# Patient Record
Sex: FEMALE
[De-identification: names, ages, dates, MRNs, and addresses within clinical notes are randomized per-mention and may not be internally consistent; named-entity substitution may affect disease eponyms.]

---

## 2019-10-01 ENCOUNTER — HOSPITAL ENCOUNTER (OUTPATIENT)
Dept: HOSPITAL 88 - WCC | Age: 74
LOS: 30 days | End: 2019-10-31
Attending: FAMILY MEDICINE
Payer: MEDICARE

## 2019-10-01 DIAGNOSIS — B95.2: ICD-10-CM

## 2019-10-01 DIAGNOSIS — E03.9: ICD-10-CM

## 2019-10-01 DIAGNOSIS — B96.89: ICD-10-CM

## 2019-10-01 DIAGNOSIS — I87.2: ICD-10-CM

## 2019-10-01 DIAGNOSIS — M96.89: ICD-10-CM

## 2019-10-01 DIAGNOSIS — T81.32XA: Primary | ICD-10-CM

## 2019-10-01 DIAGNOSIS — M13.80: ICD-10-CM

## 2019-10-01 DIAGNOSIS — R60.0: ICD-10-CM

## 2019-11-25 ENCOUNTER — HOSPITAL ENCOUNTER (OUTPATIENT)
Dept: HOSPITAL 88 - WCC | Age: 74
LOS: 5 days | End: 2019-11-30
Attending: FAMILY MEDICINE
Payer: MEDICARE

## 2019-11-25 DIAGNOSIS — M13.80: ICD-10-CM

## 2019-11-25 DIAGNOSIS — B95.2: ICD-10-CM

## 2019-11-25 DIAGNOSIS — E03.9: ICD-10-CM

## 2019-11-25 DIAGNOSIS — B96.89: ICD-10-CM

## 2019-11-25 DIAGNOSIS — T81.32XA: Primary | ICD-10-CM

## 2019-11-25 DIAGNOSIS — M96.89: ICD-10-CM

## 2019-11-25 DIAGNOSIS — I87.2: ICD-10-CM

## 2019-11-25 DIAGNOSIS — R60.0: ICD-10-CM

## 2020-02-04 NOTE — PROGRESS NOTE
DATE:  02/04/2020

 

Followup Progress Note 

 

SUBJECTIVE:  The patient came to [facility] today for a followup visit. 

 

PHYSICAL EXAMINATION:

Ulcer on distal medial right foot is slightly smaller and covered with 100% tan slough.

There is no malodor or periulcer skin erythema identified.  The ulcer is probable to

bone. 

 

The patient states that her pain in right foot/ankle has gone down to 5.  She completed

the course of oral cefdinir 300 mg twice daily today. 

PROCEDURE NOTE:  After obtaining consent from the patient, a time-out was performed.

The patient refused site marking.  After checking the patient's allergies, EMLA was

applied to the ulcer on bilateral right foot.  Full-thickness sharp surgical debridement

to subcutaneous tissue was performed with curette without complications.  Hemostasis was

achieved with pressure. 

 

An allogeneic EpiFix graft obtained from the human placenta #3 was applied to the ulcer

on medial right foot to promote granulation tissue and to aid in covering the probable

bone.  This procedure was performed in a sterile manner.  The EpiFix graft was then

secured with steri-strips and compression dressing with Drawtex was applied over the

graft. 

 

Lot number of the graft [id number].  The expiration date of graft is 2024-10-01. 

 

The pre and post surgical measurements are recorded.   

 

The EpiFix used is 18.0 mm, which is equal to 3 cm2.  100% of the graft was used.  There

was no wastage of the graft. 

 

DIAGNOSES:  

1. Chronic venous stasis ulcers, distal medial right foot.

2. Cellulitis, right foot, resolved.

 

PLAN:  

1. Change secondary dressing over EpiFix graft twice weekly at [facility], distal medial right foot. 

2. The patient is instructed to call Infectious Disease, Dr. Cherry's office today to

ask whether she is going to give her additional 1 to 2 weeks of oral cefdinir.  The

patient verbalized understanding. 

3. Continue wearing knee high elastic compression stockings on bilateral lower

extremities for edema control daily.  Remove them at night. 

4. Continue taking Mike daily for optimal wound healing.

5. Continue taking vitamin C daily for optimal wound healing.

6. Return to [facility] in 1 week

for followup visit with MD. 

 

 

 

 

______________________________

MD DANIELA Zarate/RISHABH

D:  02/04/2020 13:04:59

T:  02/04/2020 15:58:27

Job #:  [id number]

## 2020-02-28 ENCOUNTER — HOSPITAL ENCOUNTER (OUTPATIENT)
Dept: HOSPITAL 88 - WCC | Age: 75
LOS: 1 days | End: 2020-02-29
Attending: FAMILY MEDICINE
Payer: MEDICARE

## 2020-02-28 DIAGNOSIS — L03.818: ICD-10-CM

## 2020-02-28 DIAGNOSIS — I87.2: ICD-10-CM

## 2020-02-28 DIAGNOSIS — I10: ICD-10-CM

## 2020-02-28 DIAGNOSIS — R60.0: ICD-10-CM

## 2020-02-28 DIAGNOSIS — L97.811: ICD-10-CM

## 2020-02-28 DIAGNOSIS — I87.331: Primary | ICD-10-CM

## 2020-02-28 DIAGNOSIS — M13.80: ICD-10-CM

## 2020-02-28 DIAGNOSIS — B96.89: ICD-10-CM

## 2020-02-28 DIAGNOSIS — E03.9: ICD-10-CM

## 2020-02-28 DIAGNOSIS — M96.89: ICD-10-CM

## 2020-02-28 PROCEDURE — 15275 SKIN SUB GRAFT FACE/NK/HF/G: CPT

## 2020-02-28 PROCEDURE — 29581 APPL MULTLAYER CMPRN SYS LEG: CPT

## 2020-02-28 PROCEDURE — 99213 OFFICE O/P EST LOW 20 MIN: CPT

## 2020-03-10 ENCOUNTER — HOSPITAL ENCOUNTER (OUTPATIENT)
Dept: HOSPITAL 88 - WCC | Age: 75
LOS: 21 days | End: 2020-03-31
Attending: FAMILY MEDICINE
Payer: MEDICARE

## 2020-03-10 DIAGNOSIS — E03.9: ICD-10-CM

## 2020-03-10 DIAGNOSIS — I87.331: Primary | ICD-10-CM

## 2020-03-10 DIAGNOSIS — I10: ICD-10-CM

## 2020-03-10 DIAGNOSIS — R60.0: ICD-10-CM

## 2020-03-10 DIAGNOSIS — L97.811: ICD-10-CM

## 2020-03-10 DIAGNOSIS — L03.116: ICD-10-CM

## 2020-03-10 DIAGNOSIS — M96.89: ICD-10-CM

## 2020-03-10 DIAGNOSIS — I87.2: ICD-10-CM

## 2020-03-10 DIAGNOSIS — M13.80: ICD-10-CM

## 2020-03-10 DIAGNOSIS — B96.89: ICD-10-CM

## 2020-03-10 PROCEDURE — 15275 SKIN SUB GRAFT FACE/NK/HF/G: CPT

## 2020-03-10 PROCEDURE — 99213 OFFICE O/P EST LOW 20 MIN: CPT

## 2020-03-10 PROCEDURE — 29581 APPL MULTLAYER CMPRN SYS LEG: CPT

## 2020-04-23 ENCOUNTER — HOSPITAL ENCOUNTER (OUTPATIENT)
Dept: HOSPITAL 88 - WCC | Age: 75
LOS: 7 days | End: 2020-04-30
Attending: FAMILY MEDICINE
Payer: MEDICARE

## 2020-04-23 DIAGNOSIS — M13.80: ICD-10-CM

## 2020-04-23 DIAGNOSIS — I10: ICD-10-CM

## 2020-04-23 DIAGNOSIS — L97.811: ICD-10-CM

## 2020-04-23 DIAGNOSIS — I87.331: Primary | ICD-10-CM

## 2020-04-23 DIAGNOSIS — R60.0: ICD-10-CM

## 2020-04-23 DIAGNOSIS — I87.2: ICD-10-CM

## 2020-04-23 DIAGNOSIS — M96.89: ICD-10-CM

## 2020-04-23 DIAGNOSIS — E03.9: ICD-10-CM

## 2020-05-21 ENCOUNTER — HOSPITAL ENCOUNTER (OUTPATIENT)
Dept: HOSPITAL 88 - WCC | Age: 75
LOS: 10 days | End: 2020-05-31
Attending: FAMILY MEDICINE
Payer: MEDICARE

## 2020-05-21 DIAGNOSIS — R60.0: ICD-10-CM

## 2020-05-21 DIAGNOSIS — E03.9: ICD-10-CM

## 2020-05-21 DIAGNOSIS — M96.89: ICD-10-CM

## 2020-05-21 DIAGNOSIS — I87.2: ICD-10-CM

## 2020-05-21 DIAGNOSIS — M13.80: ICD-10-CM

## 2020-05-21 DIAGNOSIS — I10: ICD-10-CM

## 2020-05-21 DIAGNOSIS — L97.811: ICD-10-CM

## 2020-05-21 DIAGNOSIS — I87.331: Primary | ICD-10-CM

## 2020-09-24 ENCOUNTER — HOSPITAL ENCOUNTER (OUTPATIENT)
Dept: HOSPITAL 88 - WCC | Age: 75
LOS: 6 days | End: 2020-09-30
Attending: FAMILY MEDICINE
Payer: MEDICARE

## 2020-09-24 DIAGNOSIS — I87.331: Primary | ICD-10-CM

## 2020-09-24 DIAGNOSIS — E03.9: ICD-10-CM

## 2020-09-24 DIAGNOSIS — L97.811: ICD-10-CM

## 2020-09-24 DIAGNOSIS — I87.2: ICD-10-CM

## 2020-09-24 DIAGNOSIS — M13.80: ICD-10-CM

## 2020-09-24 DIAGNOSIS — M96.89: ICD-10-CM

## 2020-09-24 DIAGNOSIS — R60.0: ICD-10-CM

## 2020-09-24 DIAGNOSIS — I10: ICD-10-CM

## 2020-09-24 PROCEDURE — 87071 CULTURE AEROBIC QUANT OTHER: CPT

## 2020-09-24 PROCEDURE — 87186 SC STD MICRODIL/AGAR DIL: CPT

## 2020-09-24 PROCEDURE — 87075 CULTR BACTERIA EXCEPT BLOOD: CPT

## 2020-09-24 PROCEDURE — 87205 SMEAR GRAM STAIN: CPT

## 2020-10-15 ENCOUNTER — HOSPITAL ENCOUNTER (OUTPATIENT)
Dept: HOSPITAL 88 - WCC | Age: 75
LOS: 16 days | End: 2020-10-31
Attending: FAMILY MEDICINE
Payer: MEDICARE

## 2020-10-15 DIAGNOSIS — R60.0: ICD-10-CM

## 2020-10-15 DIAGNOSIS — B96.89: ICD-10-CM

## 2020-10-15 DIAGNOSIS — I87.331: Primary | ICD-10-CM

## 2020-10-15 DIAGNOSIS — M96.89: ICD-10-CM

## 2020-10-15 DIAGNOSIS — I87.2: ICD-10-CM

## 2020-10-15 DIAGNOSIS — M13.80: ICD-10-CM

## 2020-10-15 DIAGNOSIS — I10: ICD-10-CM

## 2020-10-15 DIAGNOSIS — E03.9: ICD-10-CM

## 2020-10-15 DIAGNOSIS — L97.811: ICD-10-CM

## 2020-11-19 ENCOUNTER — HOSPITAL ENCOUNTER (OUTPATIENT)
Dept: HOSPITAL 88 - WCC | Age: 75
LOS: 11 days | End: 2020-11-30
Attending: FAMILY MEDICINE
Payer: MEDICARE

## 2020-11-19 DIAGNOSIS — I87.331: Primary | ICD-10-CM

## 2020-11-19 DIAGNOSIS — M96.89: ICD-10-CM

## 2020-11-19 DIAGNOSIS — I10: ICD-10-CM

## 2020-11-19 DIAGNOSIS — M13.80: ICD-10-CM

## 2020-11-19 DIAGNOSIS — L97.811: ICD-10-CM

## 2020-11-19 DIAGNOSIS — I87.2: ICD-10-CM

## 2020-11-19 DIAGNOSIS — E03.9: ICD-10-CM

## 2020-11-19 DIAGNOSIS — R60.0: ICD-10-CM

## 2020-11-19 DIAGNOSIS — B96.89: ICD-10-CM

## 2021-08-10 LAB
ALBUMIN SERPL-MCNC: 3.2 G/DL (ref 3.5–5)
ALBUMIN/GLOB SERPL: 0.8 {RATIO} (ref 0.8–2)
ALP SERPL-CCNC: 64 IU/L (ref 40–150)
ALT SERPL-CCNC: 19 IU/L (ref 0–55)
ANION GAP SERPL CALC-SCNC: 12 MMOL/L (ref 8–16)
BASOPHILS # BLD AUTO: 0 10*3/UL (ref 0–0.1)
BASOPHILS NFR BLD AUTO: 0.5 % (ref 0–1)
BUN SERPL-MCNC: 12 MG/DL (ref 7–26)
BUN/CREAT SERPL: 19 (ref 6–25)
CALCIUM SERPL-MCNC: 8.5 MG/DL (ref 8.4–10.2)
CHLORIDE SERPL-SCNC: 108 MMOL/L (ref 98–107)
CO2 SERPL-SCNC: 23 MMOL/L (ref 22–29)
DEPRECATED NEUTROPHILS # BLD AUTO: 2.6 10*3/UL (ref 2.1–6.9)
EGFRCR SERPLBLD CKD-EPI 2021: 90 ML/MIN (ref 60–?)
EOSINOPHIL # BLD AUTO: 0.2 10*3/UL (ref 0–0.4)
EOSINOPHIL NFR BLD AUTO: 2.7 % (ref 0–6)
ERYTHROCYTE [DISTWIDTH] IN CORD BLOOD: 14.6 % (ref 11.7–14.4)
GLOBULIN PLAS-MCNC: 3.8 G/DL (ref 2.3–3.5)
GLUCOSE SERPLBLD-MCNC: 116 MG/DL (ref 74–118)
HCT VFR BLD AUTO: 40.5 % (ref 34.2–44.1)
HGB BLD-MCNC: 13.1 G/DL (ref 12–16)
LYMPHOCYTES # BLD: 2.7 10*3/UL (ref 1–3.2)
LYMPHOCYTES NFR BLD AUTO: 43.8 % (ref 18–39.1)
MCH RBC QN AUTO: 32.2 PG (ref 28–32)
MCHC RBC AUTO-ENTMCNC: 32.3 G/DL (ref 31–35)
MCV RBC AUTO: 99.5 FL (ref 81–99)
MONOCYTES # BLD AUTO: 0.7 10*3/UL (ref 0.2–0.8)
MONOCYTES NFR BLD AUTO: 10.4 % (ref 4.4–11.3)
NEUTS SEG NFR BLD AUTO: 42.3 % (ref 38.7–80)
PLATELET # BLD AUTO: 294 X10E3/UL (ref 140–360)
POTASSIUM SERPL-SCNC: 4 MMOL/L (ref 3.5–5.1)
RBC # BLD AUTO: 4.07 X10E6/UL (ref 3.6–5.1)
SODIUM SERPL-SCNC: 139 MMOL/L (ref 136–145)

## 2021-08-17 ENCOUNTER — HOSPITAL ENCOUNTER (OUTPATIENT)
Dept: HOSPITAL 88 - WCC | Age: 76
LOS: 14 days | End: 2021-08-31
Attending: FAMILY MEDICINE
Payer: MEDICARE

## 2021-08-17 DIAGNOSIS — M96.89: ICD-10-CM

## 2021-08-17 DIAGNOSIS — I87.331: Primary | ICD-10-CM

## 2021-08-17 DIAGNOSIS — E03.9: ICD-10-CM

## 2021-08-17 DIAGNOSIS — I10: ICD-10-CM

## 2021-08-17 DIAGNOSIS — L97.311: ICD-10-CM

## 2021-08-17 DIAGNOSIS — M13.80: ICD-10-CM

## 2021-08-17 DIAGNOSIS — R60.0: ICD-10-CM

## 2021-08-17 PROCEDURE — 80053 COMPREHEN METABOLIC PANEL: CPT

## 2021-08-17 PROCEDURE — 87075 CULTR BACTERIA EXCEPT BLOOD: CPT

## 2021-08-17 PROCEDURE — 85025 COMPLETE CBC W/AUTO DIFF WBC: CPT

## 2021-08-17 PROCEDURE — 87205 SMEAR GRAM STAIN: CPT

## 2021-08-17 PROCEDURE — 87071 CULTURE AEROBIC QUANT OTHER: CPT

## 2021-08-17 PROCEDURE — 36415 COLL VENOUS BLD VENIPUNCTURE: CPT

## 2021-08-17 PROCEDURE — 84134 ASSAY OF PREALBUMIN: CPT

## 2021-08-17 PROCEDURE — 87186 SC STD MICRODIL/AGAR DIL: CPT

## 2021-08-17 PROCEDURE — 83036 HEMOGLOBIN GLYCOSYLATED A1C: CPT

## 2021-10-21 LAB
ALBUMIN SERPL-MCNC: 3.2 G/DL (ref 3.5–5)
ALBUMIN/GLOB SERPL: 0.9 {RATIO} (ref 0.8–2)
ALP SERPL-CCNC: 70 IU/L (ref 40–150)
ALT SERPL-CCNC: 23 IU/L (ref 0–55)
ANION GAP SERPL CALC-SCNC: 12 MMOL/L (ref 8–16)
BASOPHILS # BLD AUTO: 0 10*3/UL (ref 0–0.1)
BASOPHILS NFR BLD AUTO: 0.3 % (ref 0–1)
BUN SERPL-MCNC: 12 MG/DL (ref 7–26)
BUN/CREAT SERPL: 18 (ref 6–25)
CALCIUM SERPL-MCNC: 8.5 MG/DL (ref 8.4–10.2)
CHLORIDE SERPL-SCNC: 107 MMOL/L (ref 98–107)
CO2 SERPL-SCNC: 23 MMOL/L (ref 22–29)
DEPRECATED NEUTROPHILS # BLD AUTO: 3.1 10*3/UL (ref 2.1–6.9)
EGFRCR SERPLBLD CKD-EPI 2021: 87 ML/MIN (ref 60–?)
EOSINOPHIL # BLD AUTO: 0.2 10*3/UL (ref 0–0.4)
EOSINOPHIL NFR BLD AUTO: 3.1 % (ref 0–6)
ERYTHROCYTE [DISTWIDTH] IN CORD BLOOD: 13.3 % (ref 11.7–14.4)
GLOBULIN PLAS-MCNC: 3.5 G/DL (ref 2.3–3.5)
GLUCOSE SERPLBLD-MCNC: 106 MG/DL (ref 74–118)
HCT VFR BLD AUTO: 39.9 % (ref 34.2–44.1)
HGB BLD-MCNC: 12.9 G/DL (ref 12–16)
LYMPHOCYTES # BLD: 2.7 10*3/UL (ref 1–3.2)
LYMPHOCYTES NFR BLD AUTO: 40 % (ref 18–39.1)
MCH RBC QN AUTO: 32 PG (ref 28–32)
MCHC RBC AUTO-ENTMCNC: 32.3 G/DL (ref 31–35)
MCV RBC AUTO: 99 FL (ref 81–99)
MONOCYTES # BLD AUTO: 0.7 10*3/UL (ref 0.2–0.8)
MONOCYTES NFR BLD AUTO: 10.2 % (ref 4.4–11.3)
NEUTS SEG NFR BLD AUTO: 46.1 % (ref 38.7–80)
PLATELET # BLD AUTO: 298 X10E3/UL (ref 140–360)
POTASSIUM SERPL-SCNC: 4 MMOL/L (ref 3.5–5.1)
RBC # BLD AUTO: 4.03 X10E6/UL (ref 3.6–5.1)
SODIUM SERPL-SCNC: 138 MMOL/L (ref 136–145)

## 2021-10-28 ENCOUNTER — HOSPITAL ENCOUNTER (OUTPATIENT)
Dept: HOSPITAL 88 - WCC | Age: 76
LOS: 3 days | End: 2021-10-31
Attending: FAMILY MEDICINE
Payer: MEDICARE

## 2021-10-28 DIAGNOSIS — R60.0: ICD-10-CM

## 2021-10-28 DIAGNOSIS — L97.311: ICD-10-CM

## 2021-10-28 DIAGNOSIS — E03.9: ICD-10-CM

## 2021-10-28 DIAGNOSIS — I10: ICD-10-CM

## 2021-10-28 DIAGNOSIS — M96.89: ICD-10-CM

## 2021-10-28 DIAGNOSIS — I87.311: Primary | ICD-10-CM

## 2021-10-28 DIAGNOSIS — M13.80: ICD-10-CM

## 2021-10-28 PROCEDURE — 80053 COMPREHEN METABOLIC PANEL: CPT

## 2021-10-28 PROCEDURE — 84134 ASSAY OF PREALBUMIN: CPT

## 2021-10-28 PROCEDURE — 87186 SC STD MICRODIL/AGAR DIL: CPT

## 2021-10-28 PROCEDURE — 83036 HEMOGLOBIN GLYCOSYLATED A1C: CPT

## 2021-10-28 PROCEDURE — 36415 COLL VENOUS BLD VENIPUNCTURE: CPT

## 2021-10-28 PROCEDURE — 87205 SMEAR GRAM STAIN: CPT

## 2021-10-28 PROCEDURE — 85025 COMPLETE CBC W/AUTO DIFF WBC: CPT

## 2021-10-28 PROCEDURE — 87075 CULTR BACTERIA EXCEPT BLOOD: CPT

## 2021-10-28 PROCEDURE — 87071 CULTURE AEROBIC QUANT OTHER: CPT

## 2021-11-04 ENCOUNTER — HOSPITAL ENCOUNTER (OUTPATIENT)
Dept: HOSPITAL 88 - MRI | Age: 76
End: 2021-11-04
Payer: MEDICARE

## 2021-11-04 DIAGNOSIS — L02.415: Primary | ICD-10-CM

## 2021-11-18 ENCOUNTER — HOSPITAL ENCOUNTER (OUTPATIENT)
Dept: HOSPITAL 88 - WCC | Age: 76
LOS: 12 days | End: 2021-11-30
Attending: FAMILY MEDICINE
Payer: MEDICARE

## 2021-11-18 DIAGNOSIS — E03.9: ICD-10-CM

## 2021-11-18 DIAGNOSIS — B96.89: ICD-10-CM

## 2021-11-18 DIAGNOSIS — R60.0: ICD-10-CM

## 2021-11-18 DIAGNOSIS — I10: ICD-10-CM

## 2021-11-18 DIAGNOSIS — L97.311: ICD-10-CM

## 2021-11-18 DIAGNOSIS — B96.4: ICD-10-CM

## 2021-11-18 DIAGNOSIS — I87.311: Primary | ICD-10-CM

## 2021-11-18 DIAGNOSIS — M13.80: ICD-10-CM

## 2021-11-18 DIAGNOSIS — M96.89: ICD-10-CM

## 2021-12-16 ENCOUNTER — HOSPITAL ENCOUNTER (OUTPATIENT)
Dept: HOSPITAL 88 - WCC | Age: 76
LOS: 15 days | End: 2021-12-31
Attending: FAMILY MEDICINE
Payer: MEDICARE

## 2021-12-16 DIAGNOSIS — L97.311: ICD-10-CM

## 2021-12-16 DIAGNOSIS — B96.89: ICD-10-CM

## 2021-12-16 DIAGNOSIS — M96.89: ICD-10-CM

## 2021-12-16 DIAGNOSIS — I87.311: Primary | ICD-10-CM

## 2021-12-16 DIAGNOSIS — M13.80: ICD-10-CM

## 2021-12-16 DIAGNOSIS — R60.0: ICD-10-CM

## 2021-12-16 DIAGNOSIS — E03.9: ICD-10-CM

## 2021-12-16 DIAGNOSIS — B96.4: ICD-10-CM

## 2021-12-16 DIAGNOSIS — I10: ICD-10-CM

## 2021-12-16 PROCEDURE — 87075 CULTR BACTERIA EXCEPT BLOOD: CPT

## 2021-12-16 PROCEDURE — 87205 SMEAR GRAM STAIN: CPT

## 2021-12-16 PROCEDURE — 87071 CULTURE AEROBIC QUANT OTHER: CPT

## 2022-01-25 ENCOUNTER — HOSPITAL ENCOUNTER (OUTPATIENT)
Dept: HOSPITAL 88 - WCC | Age: 77
LOS: 6 days | End: 2022-01-31
Attending: FAMILY MEDICINE
Payer: MEDICARE

## 2022-01-25 DIAGNOSIS — B96.89: ICD-10-CM

## 2022-01-25 DIAGNOSIS — E03.9: ICD-10-CM

## 2022-01-25 DIAGNOSIS — M96.89: ICD-10-CM

## 2022-01-25 DIAGNOSIS — I10: ICD-10-CM

## 2022-01-25 DIAGNOSIS — L97.311: ICD-10-CM

## 2022-01-25 DIAGNOSIS — R60.0: ICD-10-CM

## 2022-01-25 DIAGNOSIS — I87.311: Primary | ICD-10-CM

## 2022-01-25 DIAGNOSIS — B96.4: ICD-10-CM

## 2022-01-25 DIAGNOSIS — M13.80: ICD-10-CM

## 2022-02-15 ENCOUNTER — HOSPITAL ENCOUNTER (OUTPATIENT)
Dept: HOSPITAL 88 - WCC | Age: 77
LOS: 13 days | End: 2022-02-28
Attending: FAMILY MEDICINE
Payer: MEDICARE

## 2022-02-15 DIAGNOSIS — E03.9: ICD-10-CM

## 2022-02-15 DIAGNOSIS — M96.89: ICD-10-CM

## 2022-02-15 DIAGNOSIS — I10: ICD-10-CM

## 2022-02-15 DIAGNOSIS — L97.311: ICD-10-CM

## 2022-02-15 DIAGNOSIS — M13.80: ICD-10-CM

## 2022-02-15 DIAGNOSIS — I87.311: Primary | ICD-10-CM

## 2022-02-15 DIAGNOSIS — R60.0: ICD-10-CM

## 2022-06-21 ENCOUNTER — HOSPITAL ENCOUNTER (OUTPATIENT)
Dept: HOSPITAL 88 - WCC | Age: 77
LOS: 9 days | End: 2022-06-30
Attending: FAMILY MEDICINE
Payer: MEDICARE

## 2022-06-21 DIAGNOSIS — R60.0: ICD-10-CM

## 2022-06-21 DIAGNOSIS — L97.311: ICD-10-CM

## 2022-06-21 DIAGNOSIS — E03.9: ICD-10-CM

## 2022-06-21 DIAGNOSIS — M13.80: ICD-10-CM

## 2022-06-21 DIAGNOSIS — M96.89: ICD-10-CM

## 2022-06-21 DIAGNOSIS — I10: ICD-10-CM

## 2022-06-21 DIAGNOSIS — I87.311: Primary | ICD-10-CM

## 2022-06-21 DIAGNOSIS — I89.0: ICD-10-CM

## 2022-06-21 PROCEDURE — 87071 CULTURE AEROBIC QUANT OTHER: CPT

## 2022-06-21 PROCEDURE — 87186 SC STD MICRODIL/AGAR DIL: CPT

## 2022-06-21 PROCEDURE — 87205 SMEAR GRAM STAIN: CPT

## 2022-06-23 ENCOUNTER — HOSPITAL ENCOUNTER (OUTPATIENT)
Dept: HOSPITAL 88 - DX | Age: 77
End: 2022-06-23
Attending: FAMILY MEDICINE
Payer: MEDICARE

## 2022-06-23 DIAGNOSIS — L97.311: ICD-10-CM

## 2022-06-23 DIAGNOSIS — I87.311: Primary | ICD-10-CM

## 2022-07-28 ENCOUNTER — HOSPITAL ENCOUNTER (OUTPATIENT)
Dept: HOSPITAL 88 - WCC | Age: 77
LOS: 3 days | End: 2022-07-31
Attending: FAMILY MEDICINE
Payer: MEDICARE

## 2022-07-28 DIAGNOSIS — I10: ICD-10-CM

## 2022-07-28 DIAGNOSIS — M13.80: ICD-10-CM

## 2022-07-28 DIAGNOSIS — R60.0: ICD-10-CM

## 2022-07-28 DIAGNOSIS — I87.311: Primary | ICD-10-CM

## 2022-07-28 DIAGNOSIS — I89.0: ICD-10-CM

## 2022-07-28 DIAGNOSIS — L97.311: ICD-10-CM

## 2022-07-28 DIAGNOSIS — M96.89: ICD-10-CM

## 2022-07-28 DIAGNOSIS — B96.89: ICD-10-CM

## 2022-07-28 DIAGNOSIS — E03.9: ICD-10-CM

## 2022-08-24 ENCOUNTER — HOSPITAL ENCOUNTER (OUTPATIENT)
Dept: HOSPITAL 88 - WCC | Age: 77
LOS: 7 days | End: 2022-08-31
Attending: PODIATRIST
Payer: MEDICARE

## 2022-08-24 DIAGNOSIS — E03.9: ICD-10-CM

## 2022-08-24 DIAGNOSIS — I87.311: Primary | ICD-10-CM

## 2022-08-24 DIAGNOSIS — M13.80: ICD-10-CM

## 2022-08-24 DIAGNOSIS — L97.311: ICD-10-CM

## 2022-08-24 DIAGNOSIS — M96.89: ICD-10-CM

## 2022-08-24 DIAGNOSIS — R60.0: ICD-10-CM

## 2022-08-24 DIAGNOSIS — I89.0: ICD-10-CM

## 2022-08-24 DIAGNOSIS — I10: ICD-10-CM

## 2022-08-24 DIAGNOSIS — B96.89: ICD-10-CM

## 2022-08-24 PROCEDURE — 87186 SC STD MICRODIL/AGAR DIL: CPT

## 2022-08-24 PROCEDURE — 87071 CULTURE AEROBIC QUANT OTHER: CPT

## 2022-08-24 PROCEDURE — 87075 CULTR BACTERIA EXCEPT BLOOD: CPT

## 2022-08-24 PROCEDURE — 87205 SMEAR GRAM STAIN: CPT

## 2022-09-28 ENCOUNTER — HOSPITAL ENCOUNTER (OUTPATIENT)
Dept: HOSPITAL 88 - WCC | Age: 77
LOS: 2 days | End: 2022-09-30
Attending: PODIATRIST
Payer: MEDICARE

## 2022-09-28 DIAGNOSIS — S91.301A: ICD-10-CM

## 2022-09-28 DIAGNOSIS — M13.80: ICD-10-CM

## 2022-09-28 DIAGNOSIS — I89.0: ICD-10-CM

## 2022-09-28 DIAGNOSIS — E11.40: ICD-10-CM

## 2022-09-28 DIAGNOSIS — E11.42: ICD-10-CM

## 2022-09-28 DIAGNOSIS — M96.89: Primary | ICD-10-CM

## 2022-09-28 DIAGNOSIS — R60.0: ICD-10-CM

## 2022-09-28 DIAGNOSIS — I10: ICD-10-CM

## 2022-09-28 DIAGNOSIS — E03.9: ICD-10-CM
